# Patient Record
Sex: MALE | URBAN - METROPOLITAN AREA
[De-identification: names, ages, dates, MRNs, and addresses within clinical notes are randomized per-mention and may not be internally consistent; named-entity substitution may affect disease eponyms.]

---
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
## 2024-02-23 ENCOUNTER — HOSPITAL ENCOUNTER (OUTPATIENT)
Facility: CLINIC | Age: 19
Discharge: HOME | End: 2024-02-23
Attending: INTERNAL MEDICINE
Payer: COMMERCIAL

## 2024-02-23 VITALS
HEART RATE: 86 BPM | SYSTOLIC BLOOD PRESSURE: 146 MMHG | OXYGEN SATURATION: 98 % | DIASTOLIC BLOOD PRESSURE: 92 MMHG | TEMPERATURE: 98.5 F

## 2024-02-23 DIAGNOSIS — J20.9 ACUTE BRONCHITIS, UNSPECIFIED ORGANISM: Primary | ICD-10-CM

## 2024-02-23 PROCEDURE — S9083 URGENT CARE CENTER GLOBAL: HCPCS | Performed by: NURSE PRACTITIONER

## 2024-02-23 PROCEDURE — 99204 OFFICE O/P NEW MOD 45 MIN: CPT | Performed by: NURSE PRACTITIONER

## 2024-02-23 RX ORDER — AZITHROMYCIN 250 MG/1
TABLET, FILM COATED ORAL
Qty: 6 TABLET | Refills: 0 | Status: SHIPPED | OUTPATIENT
Start: 2024-02-23

## 2024-02-23 RX ORDER — PREDNISONE 20 MG/1
40 TABLET ORAL DAILY
Qty: 10 TABLET | Refills: 0 | Status: SHIPPED | OUTPATIENT
Start: 2024-02-23 | End: 2024-02-28

## 2024-02-23 RX ORDER — ALBUTEROL SULFATE 90 UG/1
2 INHALANT RESPIRATORY (INHALATION) EVERY 4 HOURS PRN
Qty: 8 G | Refills: 0 | Status: SHIPPED | OUTPATIENT
Start: 2024-02-23

## 2024-02-23 ASSESSMENT — ENCOUNTER SYMPTOMS
CHILLS: 0
CHEST TIGHTNESS: 0
WHEEZING: 0
NAUSEA: 0
COUGH: 1
HEADACHES: 0
SHORTNESS OF BREATH: 0
SORE THROAT: 0
VOMITING: 0
DIZZINESS: 0
FEVER: 0
ABDOMINAL PAIN: 0

## 2024-02-23 NOTE — ED PROVIDER NOTES
Emergency Medicine Note  HPI   HISTORY OF PRESENT ILLNESS     HPI Pt is a 17 y/o female, who presents today complaining of productive cough with chest congestion x 1 month. No PND, fevers, chills, dizziness, sob, cai, chest pain, chest tightness, wheezing, heartburn, abdominal pain, n/v/d, urine or bowel changes, fatigue, weakness, rash/lesions. Non smoker. No hx of asthma.         Patient History   PAST HISTORY     Reviewed from Nursing Triage:  Tobacco  Allergies  Meds  Problems  Med Hx  Surg Hx  Fam Hx  Soc   Hx      History reviewed. No pertinent past medical history.    History reviewed. No pertinent surgical history.    History reviewed. No pertinent family history.           Review of Systems   REVIEW OF SYSTEMS     Review of Systems   Constitutional: Negative for chills and fever.   HENT: Negative for congestion and sore throat.    Respiratory: Positive for cough. Negative for chest tightness, shortness of breath and wheezing.    Cardiovascular: Negative for chest pain.   Gastrointestinal: Negative for abdominal pain, nausea and vomiting.   Neurological: Negative for dizziness and headaches.   All other systems reviewed and are negative.        VITALS     ED Vitals    Date/Time Temp Pulse Resp BP SpO2 [facility]   02/23/24 1639 36.9 °C (98.5 °F) 86 -- 146/92 98 % KP                       Physical Exam   PHYSICAL EXAM     Physical Exam  Constitutional:       General: He is not in acute distress.     Appearance: Normal appearance. He is not ill-appearing or diaphoretic.   HENT:      Head: Normocephalic and atraumatic.      Right Ear: Hearing, tympanic membrane, ear canal and external ear normal.      Left Ear: Hearing, tympanic membrane, ear canal and external ear normal.      Nose: No congestion or rhinorrhea.      Right Sinus: No maxillary sinus tenderness.      Left Sinus: No maxillary sinus tenderness.      Mouth/Throat:      Mouth: Mucous membranes are moist.   Eyes:      Conjunctiva/sclera: Conjunctivae  normal.      Pupils: Pupils are equal, round, and reactive to light.   Cardiovascular:      Rate and Rhythm: Normal rate and regular rhythm.      Pulses: Normal pulses.      Heart sounds: Normal heart sounds.   Pulmonary:      Effort: Pulmonary effort is normal. No respiratory distress.      Breath sounds: Normal breath sounds. No stridor. No wheezing, rhonchi or rales.      Comments: Bronchial breath sounds.   Chest:      Chest wall: No tenderness.   Musculoskeletal:         General: Normal range of motion.   Skin:     General: Skin is warm and dry.   Neurological:      Mental Status: He is alert and oriented to person, place, and time.   Psychiatric:         Mood and Affect: Mood normal.           PROCEDURES     Procedures     DATA                                       ED Course & MDM   MDM / ED COURSE / CLINICAL IMPRESSION / DISPO     MDM    Suspect Acute Bronchitis  Pt is well appearing in NAD, VS stable   No resp distress or dyspnea  No indication for CXR  Rx'd Azithromycin,Prednisone, Albuterol Inhaler PRN  Mucinex DM PRN for cough   Supportive care recommended   Close follow up with PCP and ED precautions provided        Acute bronchitis, unspecified organism     _________________     ED Disposition   Discharge                   Regine Vargas CRNP  02/23/24 9241

## 2024-02-23 NOTE — ED ATTESTATION NOTE
I was immediately available to provide supervision and direction for the care of the patient.    The patient was evaluated and managed by the nurse practitioner.       Chas Figueroa DO  02/23/24 6924

## 2024-02-23 NOTE — DISCHARGE INSTRUCTIONS
You have viral bronchitis. Viral infections are self limiting but it can take 1 to 5 weeks for cough to completely resolve.    We are treating you with an antibiotic because of your chest XR, and clinical symptoms.     Use the Ventolin inhaler as needed for wheezing/ episodes of continuous coughing     Over the counter cough suppressants (Mucinex DM) or Prescription Tessalon Pearles may be helpful. Please following dosing as recommended by .  ?  Ibuprofen 400-600 mg every 4-6 hours day with meals as needed can help with the aches and pains  ?  May try lemon tea, honey, ginger, and eucalyptus for cough suppression.  ?  Try to drink 6-8 glasses of water per day (8 oz) to help you recover.  ?  It is very important that you get plenty of rest to help you recover.  ?  Cool mist humidifier in bedroom, prop yourself up at night with some pillows.  ?  Don't share glasses or eating utensils. Wash hands frequently. Avoid close respiratory contact. Disinfect all surfaces and frequently touched common items.  ?  Follow up with your primary care provider in 3 days if no improvement. Please go immediately to the emergency room if worsening symptoms, fever (>100.4), bloody sputum (mucus), shortness of breath, difficulty breathing, or weakness.